# Patient Record
Sex: MALE | Race: WHITE | NOT HISPANIC OR LATINO | URBAN - METROPOLITAN AREA
[De-identification: names, ages, dates, MRNs, and addresses within clinical notes are randomized per-mention and may not be internally consistent; named-entity substitution may affect disease eponyms.]

---

## 2019-01-07 ENCOUNTER — EMERGENCY (EMERGENCY)
Facility: HOSPITAL | Age: 74
LOS: 1 days | Discharge: HOME | End: 2019-01-07
Attending: STUDENT IN AN ORGANIZED HEALTH CARE EDUCATION/TRAINING PROGRAM
Payer: MEDICARE

## 2019-01-07 VITALS
DIASTOLIC BLOOD PRESSURE: 85 MMHG | SYSTOLIC BLOOD PRESSURE: 162 MMHG | TEMPERATURE: 98 F | OXYGEN SATURATION: 98 % | HEART RATE: 78 BPM | RESPIRATION RATE: 16 BRPM

## 2019-01-07 VITALS
SYSTOLIC BLOOD PRESSURE: 167 MMHG | OXYGEN SATURATION: 97 % | WEIGHT: 184.97 LBS | DIASTOLIC BLOOD PRESSURE: 84 MMHG | TEMPERATURE: 98 F | RESPIRATION RATE: 16 BRPM | HEART RATE: 73 BPM

## 2019-01-07 PROCEDURE — 99282 EMERGENCY DEPT VISIT SF MDM: CPT

## 2019-01-07 NOTE — ED ADULT TRIAGE NOTE - CHIEF COMPLAINT QUOTE
pt had left lower dental implants placed one week ago and a day later began feeling pain up the left side of his face and into his head  pt has been taking his antihypertensives as prescribed  taking oxycodone and motrin for pain. no fever

## 2019-01-07 NOTE — ED ADULT NURSE NOTE - OBJECTIVE STATEMENT
72 yo M w/ PMHx of HTN presents to ED c/o L sided face/head pain. Pt states several days ago he had 2 dental implants placed on L lower side, since the day after he has had continuous pain from L jaw radiating throughout head. Pt states he has been taking prescribed abx as ordered, has also taken prescribed oxycodone and advil for pain w/ limited relief. States pain currently 9/10, last took advil in waiting room, last took oxycodone around 1600. Pt c/o HA but denies vision changes, dizziness, lightheadedness. Pt denies any CP, SOB, N/V, fever, chills, urinary complaints, constipation, diarrhea, dizziness, weakness. Pt A&Ox4, lungs CTA, +central pulses. Abdomen soft, not tender, not distended. Ambulating w/ steady gait, safety and comfort maintained, no acute distress noted at this time.

## 2019-01-08 PROBLEM — Z00.00 ENCOUNTER FOR PREVENTIVE HEALTH EXAMINATION: Status: ACTIVE | Noted: 2019-01-08

## 2019-01-08 NOTE — ED PROVIDER NOTE - NSFOLLOWUPINSTRUCTIONS_ED_ALL_ED_FT
Please continue to take your oxycodone as prescribed, and supplement with over the counter medication such as Motrin or Tylenol  Please follow up at the dental clinic as previously scheduled.

## 2019-01-08 NOTE — ED PROVIDER NOTE - MEDICAL DECISION MAKING DETAILS
72yo M w/ pmhx of HTN, s/p left lower dental implants placed on 01/03, c/o worsening pain and elevated BP. Pt has been complaint with his antihypertensives meds. He has been taking Oxycodone and Motrin for pain with temporary relief. Denies any fever, chills, nausea, vomiting or any other symptoms.   - Reassurance and pain control Rafiqporfirio Wilson DO: 74 yo M pmh HTN, s/p left lower dental implants placed on 1/3 presents for worsening dental pain today and noticed elevated BP. Pt taking Oxycodone and Motrin for pain and anti-HTN for BP. no fever chills, drainage, numbness, weakness, visual changes. On exam, pt NAD, sensation intact. no signs of dental infection, no drooling or trismus. jaw with full range. no LAD. pain likely post-procedure related. recommended alternating apap with motrin cont with oxy prn until pain improves. pt will call dentist today for f/u. HTN is asymptomatic and chronic vs chronic but less controlled 2/2pain. pt to f/u with pcp for further management.

## 2019-01-08 NOTE — ED PROVIDER NOTE - OBJECTIVE STATEMENT
74yo M w/ pmhx of HTN, s/p left lower dental implants placed on 01/03, c/o worsening pain and elevated BP. Pt has been complaint with his antihypertensives meds. He has been taking Oxycodone and Motrin for pain with temporary relief. Denies any fever, chills, nausea, vomiting or any other symptoms. Pt took Advil before arrival, now pain is 5/10.

## 2019-03-18 PROBLEM — I10 ESSENTIAL (PRIMARY) HYPERTENSION: Chronic | Status: ACTIVE | Noted: 2019-01-08

## 2019-06-25 ENCOUNTER — APPOINTMENT (OUTPATIENT)
Dept: GASTROENTEROLOGY | Facility: CLINIC | Age: 74
End: 2019-06-25
Payer: MEDICARE

## 2019-06-25 VITALS
WEIGHT: 185 LBS | HEIGHT: 66.5 IN | SYSTOLIC BLOOD PRESSURE: 157 MMHG | DIASTOLIC BLOOD PRESSURE: 83 MMHG | HEART RATE: 67 BPM | BODY MASS INDEX: 29.38 KG/M2

## 2019-06-25 DIAGNOSIS — E78.00 PURE HYPERCHOLESTEROLEMIA, UNSPECIFIED: ICD-10-CM

## 2019-06-25 DIAGNOSIS — Z80.0 FAMILY HISTORY OF MALIGNANT NEOPLASM OF DIGESTIVE ORGANS: ICD-10-CM

## 2019-06-25 DIAGNOSIS — I10 ESSENTIAL (PRIMARY) HYPERTENSION: ICD-10-CM

## 2019-06-25 DIAGNOSIS — Z87.891 PERSONAL HISTORY OF NICOTINE DEPENDENCE: ICD-10-CM

## 2019-06-25 DIAGNOSIS — K21.9 GASTRO-ESOPHAGEAL REFLUX DISEASE W/OUT ESOPHAGITIS: ICD-10-CM

## 2019-06-25 DIAGNOSIS — Z78.9 OTHER SPECIFIED HEALTH STATUS: ICD-10-CM

## 2019-06-25 DIAGNOSIS — Z86.010 PERSONAL HISTORY OF COLONIC POLYPS: ICD-10-CM

## 2019-06-25 PROCEDURE — 82274 ASSAY TEST FOR BLOOD FECAL: CPT | Mod: QW

## 2019-06-25 PROCEDURE — 99204 OFFICE O/P NEW MOD 45 MIN: CPT

## 2019-06-25 RX ORDER — SIMVASTATIN 10 MG/1
10 TABLET, FILM COATED ORAL
Refills: 0 | Status: ACTIVE | COMMUNITY

## 2019-06-25 RX ORDER — AMLODIPINE BESYLATE AND BENAZEPRIL HYDROCHLORIDE 5; 20 MG/1; MG/1
5-20 CAPSULE ORAL
Refills: 0 | Status: ACTIVE | COMMUNITY

## 2019-07-01 ENCOUNTER — LABORATORY RESULT (OUTPATIENT)
Age: 74
End: 2019-07-01

## 2019-07-01 ENCOUNTER — APPOINTMENT (OUTPATIENT)
Dept: GASTROENTEROLOGY | Facility: CLINIC | Age: 74
End: 2019-07-01
Payer: MEDICARE

## 2019-07-01 PROCEDURE — 45380 COLONOSCOPY AND BIOPSY: CPT | Mod: PT

## 2019-07-03 ENCOUNTER — RESULT REVIEW (OUTPATIENT)
Age: 74
End: 2019-07-03

## 2019-07-05 ENCOUNTER — RESULT REVIEW (OUTPATIENT)
Age: 74
End: 2019-07-05

## 2019-07-08 ENCOUNTER — LABORATORY RESULT (OUTPATIENT)
Age: 74
End: 2019-07-08

## 2019-07-08 ENCOUNTER — APPOINTMENT (OUTPATIENT)
Dept: GASTROENTEROLOGY | Facility: CLINIC | Age: 74
End: 2019-07-08
Payer: MEDICARE

## 2019-07-08 PROCEDURE — 43239 EGD BIOPSY SINGLE/MULTIPLE: CPT

## 2019-07-08 NOTE — HISTORY OF PRESENT ILLNESS
[FreeTextEntry1] : This 73-year-old gentleman is referred for an evaluation prior to scheduling a follow-up colonoscopy. He had a colonoscopy in  by Dr. Kadeem Jensen, and believes that a polyp was removed at that time. He has had no change to his bowel habits, abdominal pain or visible blood in the stool. There is no family history of colon polyps or colon cancer. His father  secondary to pancreatic cancer at age 58. He has intermittent heartburn 3 times a week, but attributes it to stress. He takes Gaviscon and famotidine 10 mg OTC for relief of heartburn. He has hypercholesterolemia, for which he takes simvastatin 10 mg and hypertension, for which he takes amlodipine/benazepril 5/20 b.i.d. He is a tonsillectomy as a child. His mother  at age 98. His 2 brothers are healthy. He is , and his daughter is healthy. 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 He stopped smoking at age 35, but used to smoke a half a pack of cigarettes a day. He drinks alcohol rarely. He has an allergy to amoxicillin and hayfever.

## 2019-07-08 NOTE — ASSESSMENT
[FreeTextEntry1] : 1. History of colonic polyp-rule out metachronous polyps.\par 2. GERD-rule out Oneil's esophagus.\par 2. Hypertension.\par 4. Hypercholesterolemia.\par \par Plan:\par 1.The patient was advised to schedule a colonoscopy-procedure, material risks, benefits and alternatives were discussed with the patient at length. Brochure given.  MiraLax prep.\par 2. The patient was advised to schedule an upper endoscopy because of his reflux symptoms, but he is reluctant to schedule this procedure at this time since he believes that his reflux is related to increased stress. I discussed the procedure, material risks, benefits and alternatives with him. He might reconsider depending on how he feels.

## 2019-07-08 NOTE — PHYSICAL EXAM
[General Appearance - Alert] : alert [General Appearance - In No Acute Distress] : in no acute distress [General Appearance - Well Nourished] : well nourished [General Appearance - Well Developed] : well developed [General Appearance - Well-Appearing] : healthy appearing [Sclera] : the sclera and conjunctiva were normal [PERRL With Normal Accommodation] : pupils were equal in size, round, and reactive to light [Extraocular Movements] : extraocular movements were intact [Strabismus] : no strabismus was seen [Optic Disc Abnormality] : the optic disc were normal in size and color [Retina] : no retinal hemorrhages, vessel changes or exudates seen on fundoscopic exam [Outer Ear] : the ears and nose were normal in appearance [Examination Of The Oral Cavity] : the lips and gums were normal [Oropharynx] : the oropharynx was normal [Nasal Cavity] : the nasal mucosa and septum were normal [Neck Appearance] : the appearance of the neck was normal [Jugular Venous Distention Increased] : there was no jugular-venous distention [Neck Cervical Mass (___cm)] : no neck mass was observed [Thyroid Diffuse Enlargement] : the thyroid was not enlarged [Thyroid Nodule] : there were no palpable thyroid nodules [Auscultation Breath Sounds / Voice Sounds] : lungs were clear to auscultation bilaterally [Lungs Percussion] : the lungs were normal to percussion [Heart Rate And Rhythm] : heart rate was normal and rhythm regular [Heart Sounds] : normal S1 and S2 [Heart Sounds Gallop] : no gallops [Heart Sounds Pericardial Friction Rub] : no pericardial rub [Murmurs] : no murmurs [Abdominal Aorta] : the abdominal aorta was normal [Arterial Pulses Carotid] : carotid pulses were normal with no bruits [Full Pulse] : the pedal pulses are present [Veins - Varicosity Changes] : there were no varicosital changes [Bowel Sounds] : normal bowel sounds [Edema] : there was no peripheral edema [Abdomen Soft] : soft [Abdomen Tenderness] : non-tender [Abdomen Mass (___ Cm)] : no abdominal mass palpated [Normal Sphincter Tone] : normal sphincter tone [Abdomen Hernia] : no hernia was discovered [No Rectal Mass] : no rectal mass [Penis Abnormality] : the penis was normal [Testes Swelling] : the testicles were not swollen [Scrotum] : the scrotum was normal [Testes Mass (___cm)] : there were no testicular masses [Cervical Lymph Nodes Enlarged Posterior Bilaterally] : posterior cervical [Cervical Lymph Nodes Enlarged Anterior Bilaterally] : anterior cervical [Axillary Lymph Nodes Enlarged Bilaterally] : axillary [Supraclavicular Lymph Nodes Enlarged Bilaterally] : supraclavicular [Inguinal Lymph Nodes Enlarged Bilaterally] : inguinal [Femoral Lymph Nodes Enlarged Bilaterally] : femoral [No CVA Tenderness] : no ~M costovertebral angle tenderness [No Spinal Tenderness] : no spinal tenderness [Abnormal Walk] : normal gait [Nail Clubbing] : no clubbing  or cyanosis of the fingernails [Musculoskeletal - Swelling] : no joint swelling seen [Involuntary Movements] : no involuntary movements were seen [Motor Tone] : muscle strength and tone were normal [Skin Color & Pigmentation] : normal skin color and pigmentation [] : no rash [Skin Turgor] : normal skin turgor [No Focal Deficits] : no focal deficits [Skin Lesions] : no skin lesions [Oriented To Time, Place, And Person] : oriented to person, place, and time [Impaired Insight] : insight and judgment were intact [Affect] : the affect was normal [Mood] : the mood was normal [Occult Blood Positive] : stool was negative for occult blood [FreeTextEntry1] : Brown stool, Hemoccult negative, iFOBT negative

## 2019-07-10 ENCOUNTER — RESULT REVIEW (OUTPATIENT)
Age: 74
End: 2019-07-10

## 2019-07-11 ENCOUNTER — RESULT REVIEW (OUTPATIENT)
Age: 74
End: 2019-07-11

## 2020-06-22 ENCOUNTER — RX RENEWAL (OUTPATIENT)
Age: 75
End: 2020-06-22

## 2020-09-19 ENCOUNTER — RX RENEWAL (OUTPATIENT)
Age: 75
End: 2020-09-19

## 2020-12-17 ENCOUNTER — RX RENEWAL (OUTPATIENT)
Age: 75
End: 2020-12-17

## 2021-03-21 NOTE — ED ADULT TRIAGE NOTE - AS O2 DELIVERY
Cholesterol 167, Triglycerides 123, HDL 84, LDL 58  - Home meds: Atorvastatin 40 mg once daily room air

## 2021-11-11 ENCOUNTER — RX RENEWAL (OUTPATIENT)
Age: 76
End: 2021-11-11

## 2022-02-06 ENCOUNTER — RX RENEWAL (OUTPATIENT)
Age: 77
End: 2022-02-06

## 2022-02-06 RX ORDER — ESOMEPRAZOLE MAGNESIUM 40 MG/1
40 CAPSULE, DELAYED RELEASE ORAL DAILY
Qty: 90 | Refills: 0 | Status: ACTIVE | COMMUNITY
Start: 2019-07-08 | End: 1900-01-01

## 2022-06-20 ENCOUNTER — RX RENEWAL (OUTPATIENT)
Age: 77
End: 2022-06-20